# Patient Record
Sex: MALE | Race: OTHER | Employment: OTHER | URBAN - METROPOLITAN AREA
[De-identification: names, ages, dates, MRNs, and addresses within clinical notes are randomized per-mention and may not be internally consistent; named-entity substitution may affect disease eponyms.]

---

## 2017-05-28 DIAGNOSIS — I10 HYPERTENSION, BENIGN: Primary | ICD-10-CM

## 2017-05-30 NOTE — TELEPHONE ENCOUNTER
DR [facility]: please advise on further refills.     Hypertensive Medications  Protocol Criteria:  · Appointment scheduled in the past 6 months or in the next 3 months  · BMP or CMP in the past 12 months  · Creatinine result < 2  Recent Visits       Provider Depar

## 2017-05-31 RX ORDER — GABAPENTIN 300 MG/1
CAPSULE ORAL
Qty: 270 CAPSULE | Refills: 1 | Status: SHIPPED | OUTPATIENT
Start: 2017-05-31

## 2017-05-31 RX ORDER — AMLODIPINE BESYLATE 5 MG/1
TABLET ORAL
Qty: 90 TABLET | Refills: 1 | Status: SHIPPED | OUTPATIENT
Start: 2017-05-31 | End: 2019-10-22

## 2017-05-31 RX ORDER — NEBIVOLOL HYDROCHLORIDE 5 MG/1
TABLET ORAL
Qty: 90 TABLET | Refills: 1 | Status: SHIPPED | OUTPATIENT
Start: 2017-05-31 | End: 2019-10-22 | Stop reason: ALTCHOICE

## 2017-05-31 RX ORDER — CLOPIDOGREL BISULFATE 75 MG/1
TABLET ORAL
Qty: 90 TABLET | Refills: 1 | Status: SHIPPED | OUTPATIENT
Start: 2017-05-31

## 2017-05-31 NOTE — TELEPHONE ENCOUNTER
eRx's sent as per Dr KIMBER [facility] [place] [facility] message below. Spoke with pt's son (COURTNEY on file) and informed of Dr KIMBER BAL [facility] lab orders and that pt needs HTN f/u. Son scheduled appt for 7/7/17 at 3pm, as states pt leaving to [facility] for a month.  Will make sure pt completes labs (i

## 2017-09-17 DIAGNOSIS — E11.42 TYPE 2 DIABETES MELLITUS WITH DIABETIC POLYNEUROPATHY, WITHOUT LONG-TERM CURRENT USE OF INSULIN (HCC): Primary | ICD-10-CM

## 2017-09-17 DIAGNOSIS — I10 BENIGN HYPERTENSION: ICD-10-CM

## 2017-09-17 DIAGNOSIS — I10 ESSENTIAL HYPERTENSION: ICD-10-CM

## 2017-09-21 NOTE — TELEPHONE ENCOUNTER
CSS please assist patient to make appt with [facility] - has not been seen in over a year and may re route to triage for refill request

## 2017-09-21 NOTE — TELEPHONE ENCOUNTER
[facility] [phone] is calling for status of medication refill request. Per Susan Ortiz pt is out of medication and they have already given him an emergency supply.

## 2017-09-22 RX ORDER — LISINOPRIL 20 MG/1
TABLET ORAL
Qty: 30 TABLET | Refills: 0 | Status: SHIPPED | OUTPATIENT
Start: 2017-09-22 | End: 2019-10-22

## 2017-09-22 RX ORDER — SIMVASTATIN 20 MG
TABLET ORAL
Qty: 30 TABLET | Refills: 0 | Status: SHIPPED | OUTPATIENT
Start: 2017-09-22 | End: 2023-09-21

## 2017-09-22 RX ORDER — LINAGLIPTIN AND METFORMIN HYDROCHLORIDE 2.5; 1 MG/1; MG/1
TABLET, FILM COATED ORAL
Qty: 60 TABLET | Refills: 0 | Status: SHIPPED | OUTPATIENT
Start: 2017-09-22 | End: 2019-10-22

## 2017-09-22 NOTE — TELEPHONE ENCOUNTER
Pt contacted and scheduled a px on 9/29 with Dr. [facility]. Pt still requesting refills.

## 2017-09-22 NOTE — TELEPHONE ENCOUNTER
Labs and 30 day supply of meds ordered. Left VM for patient to call back. Please advise patient of orders and for pt to get labs drawn and make appt to see Dr Murray 16 before he runs out of meds again.

## 2019-10-16 ENCOUNTER — LAB ENCOUNTER (OUTPATIENT)
Dept: LAB | Age: 74
End: 2019-10-16
Attending: FAMILY MEDICINE
Payer: MEDICARE

## 2019-10-16 ENCOUNTER — OFFICE VISIT (OUTPATIENT)
Dept: FAMILY MEDICINE CLINIC | Facility: CLINIC | Age: 74
End: 2019-10-16
Payer: MEDICARE

## 2019-10-16 VITALS
DIASTOLIC BLOOD PRESSURE: 83 MMHG | SYSTOLIC BLOOD PRESSURE: 147 MMHG | BODY MASS INDEX: 29 KG/M2 | WEIGHT: 185.56 LBS | HEART RATE: 69 BPM

## 2019-10-16 DIAGNOSIS — Z95.0 PACEMAKER: ICD-10-CM

## 2019-10-16 DIAGNOSIS — H83.3X1 NOISE-INDUCED HEARING LOSS OF RIGHT EAR, UNSPECIFIED HEARING STATUS ON CONTRALATERAL SIDE: ICD-10-CM

## 2019-10-16 DIAGNOSIS — I10 ESSENTIAL HYPERTENSION: ICD-10-CM

## 2019-10-16 DIAGNOSIS — C61 PROSTATE CANCER (HCC): ICD-10-CM

## 2019-10-16 DIAGNOSIS — E11.9 DIABETES MELLITUS TYPE 2 IN NONOBESE (HCC): ICD-10-CM

## 2019-10-16 DIAGNOSIS — E11.9 DIABETES MELLITUS TYPE 2 IN NONOBESE (HCC): Primary | ICD-10-CM

## 2019-10-16 PROCEDURE — 90670 PCV13 VACCINE IM: CPT | Performed by: FAMILY MEDICINE

## 2019-10-16 PROCEDURE — 84443 ASSAY THYROID STIM HORMONE: CPT

## 2019-10-16 PROCEDURE — G0463 HOSPITAL OUTPT CLINIC VISIT: HCPCS | Performed by: FAMILY MEDICINE

## 2019-10-16 PROCEDURE — 82570 ASSAY OF URINE CREATININE: CPT

## 2019-10-16 PROCEDURE — 82043 UR ALBUMIN QUANTITATIVE: CPT

## 2019-10-16 PROCEDURE — 80053 COMPREHEN METABOLIC PANEL: CPT

## 2019-10-16 PROCEDURE — 80061 LIPID PANEL: CPT

## 2019-10-16 PROCEDURE — G0009 ADMIN PNEUMOCOCCAL VACCINE: HCPCS | Performed by: FAMILY MEDICINE

## 2019-10-16 PROCEDURE — 85025 COMPLETE CBC W/AUTO DIFF WBC: CPT

## 2019-10-16 PROCEDURE — 84153 ASSAY OF PSA TOTAL: CPT

## 2019-10-16 PROCEDURE — 99212 OFFICE O/P EST SF 10 MIN: CPT | Performed by: FAMILY MEDICINE

## 2019-10-16 PROCEDURE — 36415 COLL VENOUS BLD VENIPUNCTURE: CPT

## 2019-10-16 PROCEDURE — 83036 HEMOGLOBIN GLYCOSYLATED A1C: CPT

## 2019-10-16 NOTE — PROGRESS NOTES
Blood pressure 147/83, pulse 69, weight 185 lb 9 oz (84.2 kg). Presents today following up for diabetes that he has had for 18 years. He is noncompliant. Also for hypertension. Does not know what medications he is taking.   Complaining of poor vision

## 2019-10-17 ENCOUNTER — TELEPHONE (OUTPATIENT)
Dept: FAMILY MEDICINE CLINIC | Facility: CLINIC | Age: 74
End: 2019-10-17

## 2019-10-17 NOTE — TELEPHONE ENCOUNTER
Spoke to patient and informed of message below. Pt wants to know if he needs to do something about the PSA? States would like his diabetic, blood pressure and cholesterol medications refilled at [facility]. States should I come in for OV?     Daquan

## 2019-10-22 ENCOUNTER — TELEPHONE (OUTPATIENT)
Dept: FAMILY MEDICINE CLINIC | Facility: CLINIC | Age: 74
End: 2019-10-22

## 2019-10-22 ENCOUNTER — OFFICE VISIT (OUTPATIENT)
Dept: FAMILY MEDICINE CLINIC | Facility: CLINIC | Age: 74
End: 2019-10-22
Payer: MEDICARE

## 2019-10-22 VITALS
HEIGHT: 66 IN | HEART RATE: 70 BPM | SYSTOLIC BLOOD PRESSURE: 138 MMHG | WEIGHT: 187 LBS | RESPIRATION RATE: 20 BRPM | DIASTOLIC BLOOD PRESSURE: 76 MMHG | TEMPERATURE: 98 F | BODY MASS INDEX: 30.05 KG/M2

## 2019-10-22 DIAGNOSIS — Z12.11 ENCOUNTER FOR SCREENING COLONOSCOPY: ICD-10-CM

## 2019-10-22 DIAGNOSIS — R97.20 ELEVATED PSA: Primary | ICD-10-CM

## 2019-10-22 PROCEDURE — G0463 HOSPITAL OUTPT CLINIC VISIT: HCPCS | Performed by: FAMILY MEDICINE

## 2019-10-22 PROCEDURE — 99213 OFFICE O/P EST LOW 20 MIN: CPT | Performed by: FAMILY MEDICINE

## 2019-10-22 RX ORDER — LISINOPRIL 20 MG/1
20 TABLET ORAL
Qty: 30 TABLET | Refills: 2 | Status: SHIPPED | OUTPATIENT
Start: 2019-10-22

## 2019-10-22 RX ORDER — POTASSIUM CHLORIDE 750 MG/1
10 TABLET, EXTENDED RELEASE ORAL 2 TIMES DAILY
COMMUNITY

## 2019-10-22 RX ORDER — PENTOXIFYLLINE 400 MG/1
400 TABLET, EXTENDED RELEASE ORAL
COMMUNITY

## 2019-10-22 NOTE — PATIENT INSTRUCTIONS
Antígeno prostático específico  ¿Yvon análisis tiene otros nombres? PSA. ¿Qué es yvon análisis? Yvon análisis mide el nivel de antígeno prostático específico (PSA) que hay en la mic.   Las células de la glándula prostática producen la proteína denom También pueden realizarle yvon análisis si ya le barragan diagnosticado cáncer de próstata. Cristobal Suárez, amador proveedor de atención médica puede vigilar amador tratamiento y santos si el cáncer harris reaparecido. ¿Qué otros análisis podrían hacerme junto con yvon?   Jaylyn Resendez Para la prueba, se requiere [facility]. Se utiliza yanet aguja para extraer mic de yanet vena en el brazo o la [place]. ¿Claritza análisis implica algún riesgo? Frantz yanet Yadira de mic con yanet aguja implica algunos riesgos.  Chioma Montezing La colonoscopia es un examen que permite mirar el interior del aparato digestivo inferior (el colon y el recto). Algunas veces de puede mirar la última parte del intestino garcia [facility].  Claritza examen puede ayudar a detectar si hay cáncer de colon, a · El médico realiza un examen digital del recto para santos si hay algún problema allí o en el ano. Se lubrica el recto y se introduce el colonoscopio.   · Si usted está despierto, quizás sienta yanet sensación similar a la que tiene cuando necesita evacuar.  Ta

## 2019-10-22 NOTE — PROGRESS NOTES
Blood pressure 138/76, pulse 70, temperature 98.3 °F (36.8 °C), temperature source Oral, resp. rate 20, height 5' 6\" (1.676 m), weight 187 lb (84.8 kg). Patient presents today following up for hypertension and diabetes.   He is having difficulty because

## 2021-02-01 DIAGNOSIS — Z23 NEED FOR VACCINATION: ICD-10-CM
